# Patient Record
(demographics unavailable — no encounter records)

---

## 2025-02-20 NOTE — PHYSICAL EXAM
[Normal RLE] : Right Lower Extremity: No scars, rashes, lesions, ulcers, skin intact [Normal LLE] : Left Lower Extremity: No scars, rashes, lesions, ulcers, skin intact [Normal Touch] : sensation intact for touch [Normal] : no peripheral adenopathy appreciated [de-identified] : Patient appears stated age in no acute respiratory distress. Patient is alert oriented x3. Patient has normal mood and affect.   Bilateral Knee exam Well healed surgical incision of the left knee.  Range of motion of the knee is 0-120 Skin is normal. No rash. There is no effusion. No medial or lateral joint line tenderness. No swelling, no pitting edema. Overall alignment of the knee is then slight varus. Good anterior posterior stability. Firm endpoints on anterior and posterior drawer. Medial lateral stability is intact. Firm endpoint on medial and lateral stress testing . Casa test is negative. Quadriceps strength 5/5. There is no loss of muscle volume in the thigh. Good anterior posterior and mediolateral stability. Sensation in the extremities intact. Discrimination is intact. Good DP and PT pulses.   Bilateral Hip exam On inspection of the hip shows skin is normal. No evidence of rash. No loss of muscle. Abductor strength is 5 out of 5. Hip flexor strength is 5. Range of motion of the hip at 90 flexion internal rotation is 15 external rotation is 30 pain-free. Hip has good stability in anterior and posterior direction. On lateral decubitus examination there is no tenderness in the greater trochanter. Right sided lumbar pain.    Lower Extremity Examination Bilateral lower extremity skin is normal. There is no rash. There is no edema and lymphadenopathy. DP and PT pulses intact. Sensation is intact.  [de-identified] : X-rays of the lumbosacral spine 2 views obtained today 02/20/2025 show severe DDD of the L2-L3, L3-L4 and L4-L5.   X-rays of the right hip 2 views obtained today 02/20/2025 shows good alignment and well preserved joint space.   X-rays of the left knee 3 views obtained today 02/20/2025 shows total knee replacement components in good alignment, no loosening, well centered patella.

## 2025-02-20 NOTE — DISCUSSION/SUMMARY
[de-identified] : 67 year old female with s/p left TKR 12/09/2024 with well-functioning components. At this time, she presents with severe DDD of the lumbar spine. Patient will start with conservative treatment and treatment options were discussed including NSAIDs, ice, physical therapy, corticosteroid injections. I recommended a course of physical therapy for the lumbar spine to improve ROM and strength. A script was provided today. Rx Diclofenac provided and sent to pharmacy. I recommended that she see a spine specialist for further evaluation of the back. FU if symptoms worsen.

## 2025-02-20 NOTE — HISTORY OF PRESENT ILLNESS
[de-identified] : AXEL FAYE is a 67 year old female who presents for follow up evaluation of s/p left TKR on 12/09/2024 She denies any significant issues of the left knee at this time. She presents today complaining of right hip pain. She has right hip pain when she goes down the stairs. The pain radiates down the leg. She is ambulating on her own. She has no signs of infections and denies fever, chills, nausea, or vomiting.

## 2025-02-20 NOTE — ADDENDUM
[FreeTextEntry1] : I, Liliana Melton, acted solely as a scribe for Dr. Bony Dallas on this date 02/20/2025.  All medical record entries made by the Scribe were at my, Dr. Bony Dallas, direction and personally dictated by me on 02/20/2025. I have reviewed the chart and agree that the record accurately reflects my personal performance of the history, physical exam, assessment and plan. I have also personally directed, reviewed, and agreed with the chart.

## 2025-02-26 NOTE — PHYSICAL EXAM
[de-identified] : Examination of the lumbar spine reveals no midline tenderness palpation, step-offs, or skin lesions. Decreased range of motion with respect to flexion, extension, lateral bending, and rotation. No tenderness to palpation of the sciatic notch. No tenderness palpation of the bilateral greater trochanters. No pain with passive internal/external rotation of the hips. No instability of bilateral lower extremities.  Negative FIDENCIO. Negative straight leg raise bilaterally. No bowstring. Negative femoral stretch. 5 out of 5 iliopsoas, hip abductors, hips adductors, quadriceps, hamstrings, gastrocsoleus, tibialis anterior, extensor hallucis longus, peroneals. Grossly intact sensation to light touch bilateral lower extremities. 1+ patellar and Achilles reflexes. Downgoing Babinski. No clonus. Intact proprioception. Palpable pulses. No skin lesion and no edema on the right and left lower extremities. [de-identified] : AP lateral lumbar x-rays with some L3-4 spondylolisthesis.  She does have more significant spondylosis L4-5 and L5-S1 with some trace L4-5 spondylolisthesis

## 2025-02-26 NOTE — HISTORY OF PRESENT ILLNESS
[de-identified] : Ms. AXEL FAYE  is a 67 year old female who presents with a year of right hip and lateral thigh pain.  If she stands long the lateral thigh gets numb.  Denies any LE radicular symptoms.  Normal bowel and bladder control.   Denies any recent fevers, chills, sweats, weight loss, or infection.  She has been taking diclofenac for the past week and she feels much better.   The patients past medical history, past surgical history, medications, allergies, and social history were reviewed by me today with the patient and documented accordingly.  In addition, the patient's family history, which is noncontributory to their visit, was also reviewed.

## 2025-02-26 NOTE — DISCUSSION/SUMMARY
[de-identified] : We discussed further treatment options.  She is interested in potential epidural injections.  We will obtain a lumbar MRI to help guide this treatment.  Follow-up after the MRI.

## 2025-03-19 NOTE — PHYSICAL EXAM
[de-identified] : Examination of the lumbar spine reveals no midline tenderness palpation, step-offs, or skin lesions. Decreased range of motion with respect to flexion, extension, lateral bending, and rotation. No tenderness to palpation of the sciatic notch. No tenderness palpation of the bilateral greater trochanters. No pain with passive internal/external rotation of the hips. No instability of bilateral lower extremities.  Negative FIDENCIO. Negative straight leg raise bilaterally. No bowstring. Negative femoral stretch. 5 out of 5 iliopsoas, hip abductors, hips adductors, quadriceps, hamstrings, gastrocsoleus, tibialis anterior, extensor hallucis longus, peroneals. Grossly intact sensation to light touch bilateral lower extremities. 1+ patellar and Achilles reflexes. Downgoing Babinski. No clonus. Intact proprioception. Palpable pulses. No skin lesion and no edema on the right and left lower extremities. [de-identified] : AP lateral lumbar x-rays with some L3-4 spondylolisthesis.  She does have more significant spondylosis L4-5 and L5-S1 with some trace L4-5 spondylolisthesis  Review of her lumbar MRI reveals multilevel stenosis from L3-S1 with L4-5 spondylolisthesis.

## 2025-03-19 NOTE — HISTORY OF PRESENT ILLNESS
[de-identified] : Ms. AXEL FAYE  is a 67 year old female who presents to the office for a follow-up visit.  She is here to review her MRI results.

## 2025-03-19 NOTE — END OF VISIT
[FreeTextEntry3] : All medical record entries made by the Scribe were at my, Miguel May MD, direction and personally dictated by me on 03/19/2025. I have reviewed the chart and agree that the record accurately reflects my personal performance of the history, physical exam, assessment and plan. I have also personally directed, reviewed, and agreed with the chart. [Time Spent: ___ minutes] : I have spent [unfilled] minutes of time on the encounter which excludes teaching and separately reported services.

## 2025-03-19 NOTE — DISCUSSION/SUMMARY
[de-identified] : We discussed further treatment options. She has not had much in the way of conservative treatment measures. We briefly discussed the nature and role of surgery. She will try physical therapy and be referred for possible epidural injections. Follow up afterwards.

## 2025-03-19 NOTE — ADDENDUM
[FreeTextEntry1] : I, Shailesh Canales, documented this note as a scribe on behalf of Miguel May MD on 03/19/2025.

## 2025-03-22 NOTE — REVIEW OF SYSTEMS
[Joint Pain] : joint pain [Negative] : Heme/Lymph [FreeTextEntry9] : lumbar radicular pain You can access the FollowMyHealth Patient Portal offered by [facility] by registering at the following website: [email or web address]. By joining [employer]’s FollowMyHealth portal, you will also be able to view your health information using other applications (apps) compatible with our system.

## 2025-07-29 NOTE — ASSESSMENT
[FreeTextEntry1] : 67 y/o F, with history of right hip and lateral thigh pain. She has been seeing ortho Dr. May. She has some L3-4 spondylolisthesis and significant spondylosis at L4/5 and L5/S. Per chart review, no LE radicular symptoms. No bladder or bowel issues. Updated imaging studies including MRI lumbar spine, CT and x-ray lumbar spine ordered for possible surgical planning. Referral for PT provided.    Please see Dr. White's dictation for details. I, Dr. Darnell White evaluated the patient with the PA Young Hansen and established the plan of care. I personally discuss this patient with the PA at the time of the visit. I agree with the assessment and plan as written, unless noted below.

## 2025-07-29 NOTE — HISTORY OF PRESENT ILLNESS
[de-identified] : 68-year-old female with history of right hip and lateral thigh pain. She has been seeing ortho Dr. May. She has some L3-4 spondylolisthesis and significant spondylosis at L4/5 and L5/S. Per chart review, no LE radicular symptoms. No bladder or bowel issues.

## 2025-07-29 NOTE — HISTORY OF PRESENT ILLNESS
[de-identified] : 68-year-old female with history of right hip and lateral thigh pain. She has been seeing ortho Dr. May. She has some L3-4 spondylolisthesis and significant spondylosis at L4/5 and L5/S. Per chart review, no LE radicular symptoms. No bladder or bowel issues.

## 2025-07-29 NOTE — ASSESSMENT
[FreeTextEntry1] : 69 y/o F, with history of right hip and lateral thigh pain. She has been seeing ortho Dr. May. She has some L3-4 spondylolisthesis and significant spondylosis at L4/5 and L5/S. Per chart review, no LE radicular symptoms. No bladder or bowel issues. Updated imaging studies including MRI lumbar spine, CT and x-ray lumbar spine ordered for possible surgical planning. Referral for PT provided.    Please see Dr. White's dictation for details. I, Dr. Darnell White evaluated the patient with the PA Young Hansen and established the plan of care. I personally discuss this patient with the PA at the time of the visit. I agree with the assessment and plan as written, unless noted below.